# Patient Record
Sex: FEMALE | Race: WHITE
[De-identification: names, ages, dates, MRNs, and addresses within clinical notes are randomized per-mention and may not be internally consistent; named-entity substitution may affect disease eponyms.]

---

## 2017-07-05 ENCOUNTER — HOSPITAL ENCOUNTER (OUTPATIENT)
Dept: HOSPITAL 45 - C.ONC | Age: 62
Discharge: HOME | End: 2017-07-05
Attending: PHYSICIAN ASSISTANT
Payer: COMMERCIAL

## 2017-07-05 VITALS
DIASTOLIC BLOOD PRESSURE: 62 MMHG | HEART RATE: 69 BPM | OXYGEN SATURATION: 98 % | SYSTOLIC BLOOD PRESSURE: 99 MMHG | TEMPERATURE: 98.78 F

## 2017-07-05 DIAGNOSIS — Z85.3: ICD-10-CM

## 2017-07-05 DIAGNOSIS — Z92.3: ICD-10-CM

## 2017-07-05 DIAGNOSIS — Z08: Primary | ICD-10-CM

## 2017-07-05 NOTE — RADIATION ONCOLOGY FOLLOW-UP
Radiation Oncology Follow-Up


Date of Visit


Jul 5, 2017.


 (Sierra Pineda PA-C)





Reason For Visit


One-month follow-up and cancer survivorship care plan


 (Sierra Pineda PA-C)





Radiation Completion Date


06/05/17


 (Sierra Pineda PA-C)





Diagnosis





(1) Breast cancer


Onset Date:  2/14/2017


Histology Subtype:  lobular


Stage:  l (A)


Permanent Comment:  DIAGNOSIS: Right breast, invasive lobular carcinoma, grade 2

, ER/MI positive, Her2 negative, [id number], stage IA





TREATMENT: 


1. Biopsy - 1/12/2017


2. Lumpectomy - 2/14/2017


3. SLN 3/14/2017


4.  Status post completion of radiation therapy 06/05/2017.  Received 5130 cGy 

utilizing hypo-fractionation.  Last Edited By: Sierra Pineda on Jun 13, 2017 

09:27 (Sierra Pineda PA-C)





History of Present Illness


Ms. Mims is a 61-year-old female who recently presented with an abnormal 

mammogram on 12/21/2016 which revealed a group of calcifications in the right 

breast that were concerning.  The patient subsequently had additional views of 

the right breast with spot compression and micheal synthesis on 12/28/2016 which 

confirmed an indeterminate grouped calcifications and recommended a biopsy; 

additionally, an incidental architectural distortion was noted within the 

central middle depth of the breast and the ultrasound confirmed a irregular 

hypoechoic structure measuring 5 mm.  Additionally several other nodules were 

also noted to be stable.  





The patient underwent multiple biopsies on 01/05/2017 and 01/12/2017:





1/5/2017: Right breast, 11:00 areolar edge, core biopsy:  Benign breast tissue 

with reactive fibrosis, cholesterol and hemosiderin deposition and foreign body 

giant cell reaction  No evidence of atypia or malignancy  





1/12/2017: A. Right breast, 6-7 oclock with calcification, biopsy:  Breast 

tissue with fibrocystic changes showing focal mild usual ductal epithelial 

hyperplasia, adenosis, fibrosis and  calcification.


B. Right breast directly behind nipple , biopsy:  Focal fibrosis, inflammation, 

cholesterol cleft, hemosiderin deposition and foreign giant cell reaction. 

Remaining breast tissue with fibrocystic changes with microcalcifiaction.


C. Right breast directly behind nipple, biopsy: Small foci of lobular 

intraepithelial neoplasia (LIN1-2). Fibrocystic changes showing mild usual 

ductal epithelial hyperplasia, adenosis, cyst formation, stromal fibrosis and 

microcalcification. Focal fibrosis, mild inflammation and hemosiderin 

deposition. 





The patient subsequently was taken for an excisional biopsy by Dr. Renata Arriola 

on 02/14/2017 which revealed invasive lobular carcinoma that measured 1.1 mm in 

the greatest dimension and is 1.1 mm from the inferior margin.  Additionally 

noted, was lobular carcinoma in situ.  The tumor is estrogen receptor positive, 

progesterone receptor positive and HER-2 receptor negative.  There is no 

evidence of lymphovascular space invasion.  The tumor was grade 2 and there is 

no ductal carcinoma in situ present.  Dr. Arriola plans to take the patient back 

to the operating room on 03/14/2017 for sentinel lymph node biopsy.  The 

patient is also being scheduled to see medical oncology.  We are now seeing the 

patient in consultation discuss the role of adjuvant radiation therapy.





Overall, the patient is doing really well. She is anxious to get to have her 

sentinel lymph node biopsy and get the results.


She underwent a sentinel lymph node biopsy 02/14/2017.  2 lymph nodes were 

evaluated and were negative for metastasis.





She return to our office and underwent a CT simulation.  She was found to be a 

candidate for hypo-fractionation.  This was completed 06/05/2017.  She received 

5130 cGy.


 (Sierra Pineda PA-C)





Interim History


She has been doing well over the past month.  The skin irritation steadily 

improved without difficulty.  She has noted no masses or tenderness and no 

change of the axilla.  She's had no swelling of her arm.  She has been seen in 

follow-up at [facility] and has an MRI scheduled for September.  She has a 

digital diagnostic mammogram canceled for January 2018.  She is trying harder 

to exercise on a regular basis.  She was evaluated with a DEXA scan and was 

found have osteopenia and osteoporosis.  She stated that she is not in favor of 

the antiestrogen therapy.


 (Sierra Pineda PA-C)





Allergies


Coded Allergies:  


     No Known Allergies (Unverified , 3/9/17)





Home Medications


Scheduled


B-Complex W/Biotin & Folic Aci (Super B-Complex), 1 CAP PO DAILY


Calcium/Vitamin D (Os-Chepe 500 Plus D), 1 TAB PO 3XWK


Fish Oil ([place]-3), 1 CAP PO BID


Multiple Vitamin (Multiple Vitamin), 1 TAB PO 3XWK


Probiotic Product (Probiotic), DAILY


Quercetin (Quercetin), 2 TAB PO DAILY


[Tumeric], 1 CAP PO DAILY





Review of Systems


Gastrointestinal:  


   Symptoms:  WNL


Oral:  


   Other Oral Symptoms:  patient states notes salivary glands feel larger on 

both sides of mouth


Respiratory:  


   Symptoms:  WNL


Urinary:  


   Symptoms:  WNL


Skin:  


   Symptoms:  Faint Erythema


Breast:  


   Right Upper Arm Measurement:  25.0


   Right Mid Arm Measurement:  22.0


   Right Wrist Measurement:  14.9


   Left Upper Arm Measurement:  25.0


   Left Mid Arm Measurement:  21.3


   Left Wrist Measurement:  15.0


   Arm Dominence:  Right


   Patient Cosmetic Evaluation:  Good


   Staff Cosmetic Evalaluation:  Good


Additional Notes:


She completed a distress management report and answered "no" to all questions 

other than she has concerns about memory/concentration and sleep.  She also has 

ears and worries about her diagnosis.


 (Sierra Pineda PA-C)





Physical Exam





Vital Signs








  Date Time  Temp Pulse Resp B/P (MAP) Pulse Ox O2 Delivery O2 Flow Rate FiO2


 


7/5/17 14:11 37.1 69 20 99/62 98   








Fatigue:  None


General Appearance:  no apparent distress


Eyes:  normal inspection, EOMI


ENT:  normal ENT inspection, hearing grossly normal


Neck:  no adenopathy, thyroid normal


Respiratory/Chest:  normal breath sounds, no respiratory distress, no accessory 

muscle use


Breast:


Breast examination reveals resolving hyperpigmentation on the right.  She has 

well-healed incisions.  There are no masses or tenderness and no axillary 

adenopathy.  Very mild resolving swelling the nipple.  There are no skin 

retractions.  Using the [place] score cosmesis she has a good outcome.  There 

is no wet or dry desquamation.  Left breast showed no masses or tenderness and 

no axillary adenopathy.


Cardiovascular:  regular rate, rhythm, no gallop, no murmur


Abdomen:  non tender, soft, no organomegaly


Extremities:  no pedal edema


Neurologic/Psychiatric:  no motor/sensory deficits, alert, normal mood/affect


Skin:  warm/dry


Lymphatic:  no adenopathy


 (Sierra Pineda PA-C)





Assessment & Plan


Plan: She'll continue regular follow-up with Dr. Arriola, Dr. Pineda, and Dr. Randle her family physician.  MRIs and mammography has been scheduled for the 

future.  We asked her to return to our office in 6 months.  Today we completed 

a cancer survivorship care plan.  A copy the document was given to patient.  

She may call if she has any questions or concerns in the interim.  She'll 

continue discuss her decisions about antiestrogen therapy with medical oncology.


 (Sierra Pineda PA-C)


I agree with note created by Sierra Pineda PA-C. I reviewed the patient's 

chart and information with her.  I have examined and evaluated the patient. I 

reviewed relevant clinical information and answered the patient's and/or family'

s questions. 


 (Lena Randle MD)


Total Time In Follow-Up


I spent 20 minutes speaking to the patient and performing examination.  I spent 

20 minutes reviewing information, preparing the survivorship document, and 

completing this note.


 (Sierra Pineda PA-C)


I spent 15 minutes examining and counseling the patient.  


 (Lena Randle MD)





Copy To


Renata Arriola MD; Jaxson Pineda MD; Nadine Randle M.D.





Problem Qualifiers





(1) Breast cancer:  


Breast location:  central portion of breast  Estrogen receptor status:  

positive  Patient sex:  female  Laterality:  right  Qualified Codes:  C50.111 - 

Malignant neoplasm of central portion of right female breast; Z17.0 - Estrogen 

receptor positive status [ER+]

## 2018-01-24 ENCOUNTER — HOSPITAL ENCOUNTER (OUTPATIENT)
Dept: HOSPITAL 45 - C.ONC | Age: 63
Discharge: HOME | End: 2018-01-24
Attending: PHYSICIAN ASSISTANT
Payer: COMMERCIAL

## 2018-01-24 VITALS
TEMPERATURE: 97.88 F | HEART RATE: 75 BPM | SYSTOLIC BLOOD PRESSURE: 92 MMHG | OXYGEN SATURATION: 95 % | DIASTOLIC BLOOD PRESSURE: 62 MMHG

## 2018-01-24 DIAGNOSIS — Z85.3: ICD-10-CM

## 2018-01-24 DIAGNOSIS — Z08: Primary | ICD-10-CM

## 2018-01-24 DIAGNOSIS — Z92.3: ICD-10-CM

## 2018-01-24 NOTE — RADIATION ONCOLOGY FOLLOW-UP
Radiation Oncology Follow-Up


Date of Visit


Jan 24, 2018.





Reason For Visit


6 month follow up





Radiation Completion Date


finished   6-5-2017





Diagnosis





(1) Breast cancer


Onset Date:  2/14/2017


Histology Subtype:  Lobular


Stage:  l (A)


Permanent Comment:  DIAGNOSIS: Right breast, invasive lobular carcinoma, grade 2

, ER/AL positive, Her2 negative, [id number], stage IA





TREATMENT: 


1. Biopsy - 1/12/2017


2. Lumpectomy - 2/14/2017


3. SLN 3/14/2017


4.  Status post completion of radiation therapy 06/05/2017.  Received 5130 cGy 

utilizing hypo-fractionation.  Last Edited By: Sierra Pineda on Jun 13, 2017 

09:27





History of Present Illness


Ms. Mims is a 61-year-old female who recently presented with an abnormal 

mammogram on 12/21/2016 which revealed a group of calcifications in the right 

breast that were concerning.  The patient subsequently had additional views of 

the right breast with spot compression and olvin synthesis on 12/28/2016 which 

confirmed an indeterminate grouped calcifications and recommended a biopsy; 

additionally, an incidental architectural distortion was noted within the 

central middle depth of the breast and the ultrasound confirmed a irregular 

hypoechoic structure measuring 5 mm.  Additionally several other nodules were 

also noted to be stable.  





The patient underwent multiple biopsies on 01/05/2017 and 01/12/2017:





1/5/2017: Right breast, 11:00 areolar edge, core biopsy:  Benign breast tissue 

with reactive fibrosis, cholesterol and hemosiderin deposition and foreign body 

giant cell reaction  No evidence of atypia or malignancy  





1/12/2017: A. Right breast, 6-7 oclock with calcification, biopsy:  Breast 

tissue with fibrocystic changes showing focal mild usual ductal epithelial 

hyperplasia, adenosis, fibrosis and  calcification.


B. Right breast directly behind nipple , biopsy:  Focal fibrosis, inflammation, 

cholesterol cleft, hemosiderin deposition and foreign giant cell reaction. 

Remaining breast tissue with fibrocystic changes with microcalcifiaction.


C. Right breast directly behind nipple, biopsy: Small foci of lobular 

intraepithelial neoplasia (LIN1-2). Fibrocystic changes showing mild usual 

ductal epithelial hyperplasia, adenosis, cyst formation, stromal fibrosis and 

microcalcification. Focal fibrosis, mild inflammation and hemosiderin 

deposition. 





The patient subsequently was taken for an excisional biopsy by Dr. Renata Arriola 

on 02/14/2017 which revealed invasive lobular carcinoma that measured 1.1 mm in 

the greatest dimension and is 1.1 mm from the inferior margin.  Additionally 

noted, was lobular carcinoma in situ.  The tumor is estrogen receptor positive, 

progesterone receptor positive and HER-2 receptor negative.  There is no 

evidence of lymphovascular space invasion.  The tumor was grade 2 and there is 

no ductal carcinoma in situ present.  Dr. Arriola plans to take the patient back 

to the operating room on 03/14/2017 for sentinel lymph node biopsy.  The 

patient is also being scheduled to see medical oncology.  We are now seeing the 

patient in consultation discuss the role of adjuvant radiation therapy.





Overall, the patient is doing really well. She is anxious to get to have her 

sentinel lymph node biopsy and get the results.


She underwent a sentinel lymph node biopsy 02/14/2017.  2 lymph nodes were 

evaluated and were negative for metastasis.





She return to our office and underwent a CT simulation.  She was found to be a 

candidate for hypo-fractionation.  This was completed 06/05/2017.  She received 

5130 cGy.





Interim History


She has been doing well over the past 6 months.  The hyperpigmentation of the 

skin steadily improved.  She notes some occasional mild tenderness.  She has a 

tightness of the musculature across the chest wall.  She has been doing 

stretching exercises which is helping.  She is noted no masses and no change of 

the axilla.  She did note a mole on the nipple at the 12 o'clock position.  She 

showed this to her dermatologist.  She also has 2 additional moles on the 

breast.  These are all being followed by the dermatologist.  She was seen in 

medical oncology and by Dr. Arriola.  There was discussion of the use of Evista.

  She had declined the aromatase inhibitors because of the osteopenia and 

osteoporosis that was found on a DEXA scan.  She has also declined the Evista 

due to possible side effect of depression.  She is up-to-date on mammography.  

She also was evaluated with an MRI of the breasts.





Allergies


Coded Allergies:  


     No Known Allergies (Unverified , 3/9/17)





Home Medications


Scheduled


B-Complex W/Biotin & Folic Aci (Super B-Complex), 1 CAP PO DAILY


Calcium/Vitamin D (Os-Chepe 500 Plus D), 1 TAB PO 3XWK


Fish Oil ([place]-3), 1 CAP PO BID


Multiple Vitamin (Multiple Vitamin), 1 TAB PO 3XWK


Probiotic Product (Probiotic), DAILY


Quercetin (Quercetin), 2 TAB PO DAILY


[Tumeric], 1 CAP PO DAILY





Review of Systems


Gastrointestinal:  


   Symptoms:  WNL


Oral:  


   Symptoms:  No Problems


   Other Oral Symptoms:  patient states notes salivary glands feel larger on 

both sides of mouth


Respiratory:  


   Symptoms:  WNL


Urinary:  


   Symptoms:  WNL


Skin:  


   Symptoms:  Faint Erythema


   Other Skin Symptoms:  " have an age spot or freckle on right nipple  "


Breast:  


   Right Upper Arm Measurement:  25.0


   Right Mid Arm Measurement:  20.5


   Right Wrist Measurement:  14.5


   Left Upper Arm Measurement:  27.0


   Left Mid Arm Measurement:  21.0


   Left Wrist Measurement:  15.1


   Arm Dominence:  Right


   Patient Cosmetic Evaluation:  Excellent


   Staff Cosmetic Evalaluation:  Excellent





Physical Exam





Vital Signs








  Date Time  Temp Pulse Resp B/P (MAP) Pulse Ox O2 Delivery O2 Flow Rate FiO2


 


1/24/18 13:28 36.6 75 16 92/62 95   








Fatigue:  None


General Appearance:  no apparent distress


Eyes:  normal inspection, EOMI


ENT:  normal ENT inspection, hearing grossly normal


Neck:  no adenopathy, thyroid normal


Respiratory/Chest:  lungs clear, no respiratory distress, no accessory muscle 

use


Breast:


Breast examination reveals well-healed incisions of the left breast.  There are 

no masses or tenderness no axillary adenopathy.  There is a slight amount of 

hyperpigmentation.  Striations of subcutaneous tissue are noted in the upper 

outer quadrant.  There are no skin retractions.  She does have a small light 

brown-colored mole at the top of the nipple.  Also 2 tiny moles in the upper 

outer breast closer to the nipple.  Using the [place] score cosmesis she has a 

good outcome.  The right breast showed no masses or tenderness and no axillary 

adenopathy.


Cardiovascular:  regular rate, rhythm, no gallop, no murmur


Abdomen:  non tender, soft, no organomegaly


Extremities:  no pedal edema


Neurologic/Psychiatric:  no motor/sensory deficits, alert, normal mood/affect


Skin:  warm/dry





Pain Management


Patient Reports Pain:  No


Patient Preferred Pain Scale:  0 - 10


Initial Pain Intensity:  0.0


Pain Management Plan


She denied pain therefore requires no pain management.





Laboratory


Laboratory Results:  not applicable





Pathology


Pathology Results:  not applicable





Imaging


Imaging Studies:  were reviewed, and pertinent findings noted below


Imaging Comments


Date/Time of Imaging Study 











Study Completed: 9/11/2017 9:02 AM














iSite PACS Image








Narrative 











Addendum Begins





ADDENDUM:


Please note the following correction in the history section of the report:


Subsequent excisional/lumpectomy of site if BONIFACIO demonstrated invasive lobular 

carcinoma, 1.1 mm 02/14/2017.





Addendum Ends





EXAM


9/11/2017 9:02 am-MR BREAST BILATERAL WITH/WITHOUT CONTRAST





HISTORY


6 mo f/u needed for right abnormal mri, h/o right breast cancer. History of 

right breast breast biopsies 


1/17. Biopsy sites at 12 o'clock and 6-7 o'clock were benign. Biopsy site at 9 o

'clock demonstrated BONIFACIO. 


Subsequent excisional/lumpectomy of site at BONIFACIO demonstrated invasive lobular 

carcinoma, 11 mm 02/14/2017. 


Patient is post radiation therapy 05/06/2017.





COMPARISON


MR BREAST BILATERAL WITH/WITHOUT CONTRAST dated 3/1/2017; BREAST NEEDLE BIOPSY-

CORE dated 1/12/2017; 


SCREENING OLVIN MAMMOGRAPHY dated 12/21/2016; UNILATERAL OLVIN MAMMOGRAPHY dated 1 /5/2017; US GUIDED BREAST 


BIOPSY dated 1/5/2017; BREAST NEEDLE LOCALIZATION dated 2/14/2017; BREAST 

SPECIMEN dated 2/14/2017; 


MAMMOGRAPHY UNILATERAL dated 12/28/2016; US BREAST-UNILATERAL LIMITED dated 12/ 28/2016





TECHNIQUE


Multiplanar pre-and post gadolinium enhanced MRI of the breasts. The exam was 

interpreted in conjunction 


with [employer] software with kinetic and morphologic analysis and subtraction 

images.





FINDINGS


There is heterogeneously dense breast tissue. There is minimal background 

parenchymal enhancement.





Left:No suspicious mass or non masslike enhancing lesions are seen. No 

lymphadenopathy, skin thickening, or 


nipple retraction.





Right:


1. A postoperative scar extending to the skin surface 9 o'clock middle - 

posterior depth at lumpectomy site 


is present with no suspicious enhancement to suggest residual or recurrent 

neoplasm. There is increased T2 


signal edema and small fluid at the lumpectomy site, significantly decreased 

from the prior exam. Prior 


adjacent linear enhancing focus is no longer seen and likely was artifactual.


2. Stable 6 x 7 mm nonenhancing round circumscribed increased T2 signal 

increased T2 signal nodule middle 


depth 1- 2 o'clock central is unchanged (image 29/62 series 401). Benign 

etiology such as a 


hemorrhagic/proteinaceous cyst is favored.


3. Seven o'clock posterior depth biopsy marker susceptibility artifact is 

noted. The 12 o'clock biopsy 


marker is again not seen.


No lymphadenopathy. No skin thickening is appreciated. There is no nipple 

retraction.





Extramammary findings: None





IMPRESSION





Left: BI-RADS 1.


Right: BI-RADS 2. Postsurgical changes at site of lumpectomy 9 o'clock right 

breast centrally. No findings 


to suggest residual or recurrent neoplasm. Stable 1-2 o'clock central 

nonenhancing circumscribed nodule 


consistent with benign etiology, possibly hemorrhagic/ proteinaceous cyst.





Authenticated By Authenticating Date Authenticating Time Reading Providers(s)


TESSIE PRUITT MD 10- 16:58 TESSIE PRUITT MD








Results 


MAMMOGRAM, DIAGNOSTIC, BILAT [.2] (Spec. #[id number]) (Order [id number]) 


Date/Time of Imaging Study 











Study Completed: 1/4/2018 11:23 AM














[employer] PACS Image








Narrative 











Comparison is made to images from 02/14/2017 and images from 01/12/2017 (right) 

and images from 01/05/2017 


(right) and images from 12/28/2016 and images from 12/21/2016 (bilateral) and 

images from 12/17/2015 


(bilateral) and images from 12/08/2014 (bilateral). 





Right Breast Findings:


The breast is heterogeneously dense (51% - 75% fibroglandular). This may lower 

the sensitivity of 


mammography. Post therapy changes post lumpectomy and XRT with negative SLNB 

for 1.1mm invasive lobular 


carcinoma 2/17.This will serve as new baseline. Patient elected to not have 

hormonal therapy. Biopsy markers 


at sites of benign biopsies. There are no new suspicious calcifications, masses

, or other abnormalities. 


Left Breast Findings:


The breast is heterogeneously dense (51% - 75% fibroglandular). This may lower 

the sensitivity of 


mammography.There are no suspicious calcifications, masses, or other 

abnormalities. 1:00 rim calcifications 


suggesting oil cyst. 








Authenticated By Authenticating Date Authenticating Time Reading Providers(s)


TESSIE PRUITT MD 01- 12:29 TESSIE PRUITT MD














IMPRESSION:


RIGHT BREAST: Findings are probably benign. A short interval follow-up is 

recommended in 6 months. 





LEFT BREAST: Benign, no evidence of malignancy. Normal interval follow-up is 

recommended in 12 months. 





OVERALL ASSESSMENT - CATEGORY 3 - PROBABLY BENIGN


END OF IMPRESSION


Note: Approximately 10% of breast cancers are not detected on mammography. A 

negative mammographic report 


should not delay biopsy if a clinically suggestive mass is present.





This mammogram has been analyzed with the computer aided detection system.





Tomosynthesis was done.





This notice contains the results of your recent mammogram, including 

information about breast density. If 


your mammogram shows that your breast tissue is dense, you should know that 

dense breast tissue is a common 


finding and is not abnormal. Statistics show many women could have dense or 

highly dense breasts. Dense 


breast tissue can make it harder to find cancer on a mammogram and may be 

associated with an increased risk 


of cancer. This information about the result of your mammogram is given to you 

to raise your awareness and to 


inform your conversations with your physician. Together, you can decide which 

screening options are right for 


you, based on your mammogram results, individual risk factors or physical 

examination. A report of your 


results was sent to your physician. 


Your mammographic breast density on today's study is described above. There are 

four categories of breast 


density on mammography. Fatty breasts and those with scattered fibroglandular 

tissue are not considered 


dense. Heterogeneously dense or extremely dense tissue is considered "dense". 

Please understand that 


assessment of breast density may vary from year to year.











Assessment & Plan


Continue regular follow-up with Dr. Arriola and Dr. Pineda.  Continue with 

scheduled follow-up mammography.  She'll continue follow-up with her primary 

care physician.  We asked her to return to our office in 1 year.  She may call 

if she has any questions or concerns in the interim.  Her next mammogram is 

scheduled for 07/09/2018.





Assessment & Plan (Attending)


ADDENDUM: I agree with note created by Sierra Pineda PA-C. I reviewed the 

patient's chart and information with her.  I have examined and evaluated the 

patient. I reviewed relevant clinical information and answered the patient's and

/or family's questions. 





Total Time


In Follow-Up


I spent 20 minutes speaking to the patient performing examination.  I spent 15 

minutes reviewing information in completing this note.





Total Time (Attending)


In Follow-Up


I spent 15 minutes examining and counseling the patient. 





Copy To


Renata Arriola MD; Jaxson Pineda MD; Nadine Randle M.D.





Problem Qualifiers





(1) Breast cancer:  


Breast location:  central portion of breast  Estrogen receptor status:  

positive  Patient sex:  female  Laterality:  right  Qualified Codes:  C50.111 - 

Malignant neoplasm of central portion of right female breast; Z17.0 - Estrogen 

receptor positive status [ER+]